# Patient Record
Sex: MALE | Race: WHITE | NOT HISPANIC OR LATINO | URBAN - METROPOLITAN AREA
[De-identification: names, ages, dates, MRNs, and addresses within clinical notes are randomized per-mention and may not be internally consistent; named-entity substitution may affect disease eponyms.]

---

## 2023-07-14 NOTE — ASU PATIENT PROFILE, ADULT - MEDICATIONS TO TAKE
May take medications as prescribe with sips of water, will take aspirin as instructed by Dr. Werner's office

## 2023-07-14 NOTE — H&P ADULT - ASSESSMENT
68 year old with PMHx of CAD, PCI of LAD, HTN, HLD, pulmonic regurgitation, thyroid nodules requiring cardiac clearance for staged thyroid surgery. Pt. had a nuclear stress test which revealed moderate sized area of mild ischemia affecting the basal to mid anteroseptal and basal to apical anterior walls. Pt. now referred for C for further evaluation.  Consent obtained for and signed for cardiac cath with coronary angiogram and possible stent placement with possible sedation and analgesia. C risks, benefits and alternatives discussed with patient. Risk discussed included, but not limited to MI, stroke, mortality, major bleeding, arrythmia, or infection, educational material provided. Pt. verbalizes and understands pre-procedural instructions.   ASA: II  Bleeding Risk Score: 0.9%  Creatinine: 1.1  GFR:  66  Alex Score: 1 point  Pt. pre-hydrated with sodium chloride 0.9% 250cc bolus IV x1

## 2023-07-14 NOTE — ASU PATIENT PROFILE, ADULT - VISION (WITH CORRECTIVE LENSES IF THE PATIENT USUALLY WEARS THEM):
glasses with pateint/Partially impaired: cannot see medication labels or newsprint, but can see obstacles in path, and the surrounding layout; can count fingers at arm's length

## 2023-07-14 NOTE — H&P ADULT - NSHPSOURCEINFORD_GEN_ALL_CORE
Immunization information and VIS for flulaval  vaccine(s) was reviewed; verbal consent given by patient.     Chart(s)/Patient

## 2023-07-14 NOTE — H&P ADULT - NSHPLABSRESULTS_GEN_ALL_CORE
6/23 Echo: EF 65%  6/26/23 moderate sized area of mild ischemia affecting the basal to mid anteroseptal and basal to apical walls, 6/23 Echo: EF 65%  6/26/23 moderate sized area of mild ischemia affecting the basal to mid anteroseptal and basal to apical walls,  nuclear stress test which revealed moderate sized area of mild ischemia affecting the basal to mid anteroseptal and basal to apical anterior walls.

## 2023-07-14 NOTE — H&P ADULT - NSICDXPASTMEDICALHX_GEN_ALL_CORE_FT
PAST MEDICAL HISTORY:  Coronary artery disease with other form of angina pectoris p LAD stent 2010    Hyperlipidemia     Hypertension      PAST MEDICAL HISTORY:  Aortic regurgitation     Coronary artery disease with other form of angina pectoris p LAD stent 2010    H/O carotid atherosclerosis     Hyperlipidemia     Hypertension

## 2023-07-14 NOTE — ASU PATIENT PROFILE, ADULT - FALL HARM RISK - UNIVERSAL INTERVENTIONS
Bed in lowest position, wheels locked, appropriate side rails in place/Call bell, personal items and telephone in reach/Instruct patient to call for assistance before getting out of bed or chair/Non-slip footwear when patient is out of bed/[place] to call system/Physically safe environment - no spills, clutter or unnecessary equipment/Purposeful Proactive Rounding/Room/bathroom lighting operational, light cord in reach

## 2023-07-14 NOTE — H&P ADULT - HISTORY OF PRESENT ILLNESS
68 year old with PMHx of CAD, PCI of LAD,HTN, HLD  Exercise nuclear stress suggestive of ischemia. Referred  for cardiac cath and possible PCI 68 year old with PMHx of CAD, PCI of LAD, HTN, HLD, pulmonic regurgitation, thyroid nodules requiring cardiac clearance for staged thyroid surgery. Pt. had a nuclear stress test which revealed moderate sized area of mild ischemia affecting the basal to mid anteroseptal and basal to apical anterior walls. Pt. now referred for [facility] for further evaluation.

## 2023-07-14 NOTE — ASU PATIENT PROFILE, ADULT - AS SC BRADEN FRICTION
General Sunscreen Counseling: I recommended a broad spectrum sunscreen with a SPF of 30 or higher.  I explained that SPF 30 sunscreens block approximately 97 percent of the sun's harmful rays.  Sunscreens should be applied at least 15 minutes prior to expected sun exposure and then every 2 hours after that as long as sun exposure continues. If swimming or exercising sunscreen should be reapplied every 45 minutes to an hour after getting wet or sweating.  One ounce, or the equivalent of a shot glass full of sunscreen, is adequate to protect the skin not covered by a bathing suit. I also recommended a lip balm with a sunscreen as well. Sun protective clothing is also recommended. Detail Level: Detailed (3) no apparent problem

## 2023-07-17 ENCOUNTER — OUTPATIENT (OUTPATIENT)
Dept: OUTPATIENT SERVICES | Facility: HOSPITAL | Age: 68
LOS: 1 days | Discharge: HOME | End: 2023-07-17
Payer: MEDICARE

## 2023-07-17 VITALS
HEIGHT: 70 IN | TEMPERATURE: 97 F | RESPIRATION RATE: 16 BRPM | WEIGHT: 205.03 LBS | OXYGEN SATURATION: 98 % | DIASTOLIC BLOOD PRESSURE: 68 MMHG | SYSTOLIC BLOOD PRESSURE: 179 MMHG | HEART RATE: 70 BPM

## 2023-07-17 VITALS
RESPIRATION RATE: 16 BRPM | DIASTOLIC BLOOD PRESSURE: 62 MMHG | OXYGEN SATURATION: 98 % | HEART RATE: 54 BPM | SYSTOLIC BLOOD PRESSURE: 134 MMHG

## 2023-07-17 DIAGNOSIS — R94.39 ABNORMAL RESULT OF OTHER CARDIOVASCULAR FUNCTION STUDY: ICD-10-CM

## 2023-07-17 PROCEDURE — C1769: CPT

## 2023-07-17 PROCEDURE — C1887: CPT

## 2023-07-17 PROCEDURE — 93572 IV DOP VEL&/PRESS C FLO EA: CPT | Mod: RC

## 2023-07-17 PROCEDURE — 93571 IV DOP VEL&/PRESS C FLO 1ST: CPT | Mod: LD

## 2023-07-17 PROCEDURE — 93010 ELECTROCARDIOGRAM REPORT: CPT

## 2023-07-17 PROCEDURE — 93458 L HRT ARTERY/VENTRICLE ANGIO: CPT | Mod: 59

## 2023-07-17 PROCEDURE — 93005 ELECTROCARDIOGRAM TRACING: CPT

## 2023-07-17 PROCEDURE — C1894: CPT

## 2023-07-17 RX ORDER — CETIRIZINE HYDROCHLORIDE 10 MG/1
1 TABLET ORAL
Refills: 0 | DISCHARGE

## 2023-07-17 RX ORDER — NEBIVOLOL HYDROCHLORIDE 5 MG/1
1 TABLET ORAL
Refills: 0 | DISCHARGE

## 2023-07-17 RX ORDER — ATORVASTATIN CALCIUM 80 MG/1
1 TABLET, FILM COATED ORAL
Refills: 0 | DISCHARGE

## 2023-07-17 RX ORDER — SODIUM CHLORIDE 9 MG/ML
250 INJECTION INTRAMUSCULAR; INTRAVENOUS; SUBCUTANEOUS ONCE
Refills: 0 | Status: COMPLETED | OUTPATIENT
Start: 2023-07-17 | End: 2023-07-17

## 2023-07-17 RX ORDER — SODIUM CHLORIDE 9 MG/ML
1000 INJECTION INTRAMUSCULAR; INTRAVENOUS; SUBCUTANEOUS
Refills: 0 | Status: DISCONTINUED | OUTPATIENT
Start: 2023-07-17 | End: 2023-07-17

## 2023-07-17 RX ORDER — LOSARTAN POTASSIUM 100 MG/1
1 TABLET, FILM COATED ORAL
Refills: 0 | DISCHARGE

## 2023-07-17 RX ORDER — ICOSAPENT ETHYL 500 MG/1
2 CAPSULE, LIQUID FILLED ORAL
Refills: 0 | DISCHARGE

## 2023-07-17 RX ADMIN — SODIUM CHLORIDE 180 MILLILITER(S): 9 INJECTION INTRAMUSCULAR; INTRAVENOUS; SUBCUTANEOUS at 10:36

## 2023-07-17 RX ADMIN — SODIUM CHLORIDE 250 MILLILITER(S): 9 INJECTION INTRAMUSCULAR; INTRAVENOUS; SUBCUTANEOUS at 07:22

## 2023-07-17 NOTE — PACU DISCHARGE NOTE - COMMENTS
Patient s/p LHC via Right Radial artery. Pressure dressing to RUE. RUE warm and mobile. VS Stable. Patient denies pain. Discharge instructions reviewed with patient and patient verbalizes understanding. SL removed. [facility] wife for transport home

## 2023-07-17 NOTE — CHART NOTE - NSCHARTNOTEFT_GEN_A_CORE
Nurse Practitioner Progress note:   HPI:  68 year old with PMHx of CAD, PCI of LAD, HTN, HLD, pulmonic regurgitation, thyroid nodules requiring cardiac clearance for staged thyroid surgery. Pt. had a nuclear stress test which revealed moderate sized area of mild ischemia affecting the basal to mid anteroseptal and basal to apical anterior walls. Pt. now referred for [facility] for further evaluation. (14 Jul 2023 15:42)      T(C): 36.8 (07-17-23 @ 07:09), Max: 36.8 (07-17-23 @ 07:09)  HR: 57 (07-17-23 @ 09:45) (54 - 70)  BP: 145/69 (07-17-23 @ 09:30) (136/64 - 179/68)  RR: 16 (07-17-23 @ 09:45) (16 - 16)  SpO2: 97% (07-17-23 @ 09:45) (96% - 98%)  Wt(kg): --    PHYSICAL EXAM:  Neurologic: Non-focal, AxOx3.  No neuro deficits  Vascular: Peripheral pulses palpable 2+ bilaterally  Procedure Site: Rt. radial band in place site benign soft no bleeding no hematoma no c/o numbness/tingling, <3sec cap refill, fingers/hand warm to touch       PROCEDURE RESULTS:  < from: Cardiac Catheterization (07.17.23 @ 08:17) >    Procedures:               1.    Arterial Access - Right Radial   2.    Left Heart Cath   3.    Diagnostic Coronary Angiography   4.    FFR   The findings of this study were as follows:    2 Vessel CAD with moderate LAD and RCA Disease. Negative FFR in both  vessels. NL LV FX.      ASSESSMENT/PLAN: 	  68 year old with PMHx of CAD, PCI of LAD, HTN, HLD, pulmonic regurgitation, thyroid nodules requiring cardiac clearance for staged thyroid surgery. Pt. had a nuclear stress test which revealed moderate sized area of mild ischemia affecting the basal to mid anteroseptal and basal to apical anterior walls. S/P LHC       -VS, labs, diet, activity as per post cath orders  -IV hydration  -Encourage PO fluids  -Sedation instructions reviewed with patient   -Manage with medical therapy   -Continue current medications  -Pt. to be discharged home today  -Plan of care D/W pt. and MD  -Post cath instructions reviewed with pt., pt. verbalizes and understands instructions  -Follow-up with attending

## 2023-07-18 DIAGNOSIS — E78.5 HYPERLIPIDEMIA, UNSPECIFIED: ICD-10-CM

## 2023-07-18 DIAGNOSIS — I25.10 ATHEROSCLEROTIC HEART DISEASE OF NATIVE CORONARY ARTERY WITHOUT ANGINA PECTORIS: ICD-10-CM

## 2023-07-18 DIAGNOSIS — I10 ESSENTIAL (PRIMARY) HYPERTENSION: ICD-10-CM

## 2023-07-18 DIAGNOSIS — Z01.810 ENCOUNTER FOR PREPROCEDURAL CARDIOVASCULAR EXAMINATION: ICD-10-CM

## 2023-07-18 DIAGNOSIS — Z95.5 PRESENCE OF CORONARY ANGIOPLASTY IMPLANT AND GRAFT: ICD-10-CM

## 2023-07-18 DIAGNOSIS — R94.39 ABNORMAL RESULT OF OTHER CARDIOVASCULAR FUNCTION STUDY: ICD-10-CM

## 2024-09-04 NOTE — ASU PREOP CHECKLIST - TEMPERATURE IN CELSIUS (DEGREES C)
Called and spoke with Canelo Oscar regarding INR 4.9 with goal 2-3.  He denies and medication changes other then a new inhaler, states his diet remains the same.     Hold coumadin today 9/4/2024 and tomorrow 9/5/2024. Change current Coumadin 5 mg Sat, Sun, Tue, Thur along with coumadin 2.5 mg Mon , Wed and Fri.    Repeat INR in 1 week approximately 9/11/2024.     Mr. Mooer verbalizes understanding.    36.8